# Patient Record
Sex: MALE | Race: WHITE | Employment: FULL TIME | URBAN - METROPOLITAN AREA
[De-identification: names, ages, dates, MRNs, and addresses within clinical notes are randomized per-mention and may not be internally consistent; named-entity substitution may affect disease eponyms.]

---

## 2021-10-14 ENCOUNTER — HOSPITAL ENCOUNTER (OUTPATIENT)
Dept: NEUROLOGY | Age: 56
Discharge: HOME | End: 2021-10-14
Payer: COMMERCIAL

## 2021-10-14 ENCOUNTER — HOSPITAL ENCOUNTER (OUTPATIENT)
Dept: NON INVASIVE DIAGNOSTICS | Age: 56
Discharge: HOME | End: 2021-10-14
Payer: COMMERCIAL

## 2021-10-14 DIAGNOSIS — R01.2 OTHER CARDIAC SOUNDS: ICD-10-CM

## 2021-10-14 DIAGNOSIS — R03.0 ELEVATED BLOOD PRESSURE READING WITHOUT DIAGNOSIS OF HYPERTENSION: ICD-10-CM

## 2021-10-14 DIAGNOSIS — R42 DIZZINESS AND GIDDINESS: ICD-10-CM

## 2021-10-14 DIAGNOSIS — Z82.49 FAMILY HISTORY OF ISCHEMIC HEART DISEASE AND OTHER DISEASES OF THE CIRCULATORY SYSTEM: ICD-10-CM

## 2021-10-14 DIAGNOSIS — E78.5 HYPERLIPIDEMIA, UNSPECIFIED HYPERLIPIDEMIA TYPE: ICD-10-CM

## 2021-10-14 LAB
LV EF: 50 %
LVEF MODALITY: NORMAL

## 2021-10-14 PROCEDURE — 93320 DOPPLER ECHO COMPLETE: CPT

## 2021-10-14 PROCEDURE — 93351 STRESS TTE COMPLETE: CPT

## 2021-10-14 PROCEDURE — 93225 XTRNL ECG REC<48 HRS REC: CPT

## 2021-10-14 PROCEDURE — 93226 XTRNL ECG REC<48 HR SCAN A/R: CPT

## 2021-10-14 PROCEDURE — 6360000004 HC RX CONTRAST MEDICATION: Performed by: INTERNAL MEDICINE

## 2021-10-14 RX ADMIN — PERFLUTREN 1.65 MG: 6.52 INJECTION, SUSPENSION INTRAVENOUS at 08:34

## 2021-10-14 NOTE — PROGRESS NOTES
IV started for OP Definity per echo protocol. Definity given per IV per echo protocol. Pt tolerated well. IV DC'd as ordered per protocol. Pt tolerated well.

## 2021-10-15 NOTE — PROGRESS NOTES
[address]    10/19/2021    Isaac Garza (:  1965) is a 63 y.o. male is here for evaluation of near syncope and abnormal stress test.    Referring Provider: Yasmine Baugh    HISTORY:  Mr. Priyanka Valdez is here for evaluation of an abnormal stress test.  He has a history of elevated blood pressure without diagnosis of HTN, prediabetes, hyperlipidemia, obesity, and TATYANA. He does not smoke. Father was diagnosed with heart disease in early 63's but also drank alcohol, smoked cigarettes. He was in [facility] ER on 21 with lightheaded and short of breath while urinating. He often has to push hard when urinating so he usually sits down. Since he was using a gas station bathroom, he remained standing to void. He felt light headed while standing and this lasted for 30 minutes. He appeared pale and diaphoretic to his wife. EKG was unremarkable and troponin was negative x 2. He was discharged to home. He was seen in his PCP's office and stress echo was ordered. He underwent stress testing, which was abnormal, and subsequently referred for further evaluation. REVIEW OF SYSTEMS:  A complete review of systems was reviewed and is negative except as noted in the history of present illness. Prior to Visit Medications    Medication Sig Taking? Authorizing Provider   omeprazole (PRILOSEC) 20 MG delayed release capsule  Yes Historical Provider, MD   cetirizine (ZYRTEC) 10 MG tablet Take 10 mg by mouth daily Yes Historical Provider, MD   tadalafil (CIALIS) 20 MG tablet Take 20 mg by mouth as needed for Erectile Dysfunction Yes Historical Provider, MD        Allergies   Allergen Reactions    Sulfa Antibiotics Rash       History reviewed. No pertinent past medical history. History reviewed. No pertinent surgical history.     Social History     Tobacco Use    Smoking status: Never Smoker    Smokeless tobacco: Never Used   Substance Use Topics    Alcohol use: Not on file Family History   Problem Relation Age of Onset    Heart Disease Father     Hypertension Father        PHYSICAL EXAMINATION:  Vitals:    10/19/21 1015   BP: 128/88   Site: Left Upper Arm   Position: Sitting   Cuff Size: Large Adult   Pulse: 73   SpO2: 98%   Weight: 233 lb 9.6 oz (106 kg)   Height: 5' 11\" (1.803 m)     Estimated body mass index is 32.58 kg/m² as calculated from the following:    Height as of this encounter: 5' 11\" (1.803 m). Weight as of this encounter: 233 lb 9.6 oz (106 kg). Physical Exam  Constitutional:       Appearance: He is well-developed. He is not diaphoretic. HENT:      Head: Normocephalic and atraumatic. Eyes:      General: No scleral icterus. Extraocular Movements: Extraocular movements intact. Conjunctiva/sclera: Conjunctivae normal.   Neck:      Vascular: No JVD. Cardiovascular:      Rate and Rhythm: Normal rate and regular rhythm. Heart sounds: Normal heart sounds. No murmur heard. No friction rub. No gallop. Pulmonary:      Effort: Pulmonary effort is normal. No respiratory distress. Breath sounds: Normal breath sounds. No wheezing or rales. Abdominal:      General: Bowel sounds are normal.      Palpations: Abdomen is soft. Tenderness: There is no abdominal tenderness. Musculoskeletal:         General: Normal range of motion. Cervical back: Normal range of motion and neck supple. Skin:     General: Skin is warm and dry. Findings: No rash. Neurological:      General: No focal deficit present. Mental Status: He is alert and oriented to person, place, and time. Psychiatric:         Mood and Affect: Mood normal.         Behavior: Behavior normal.         Thought Content: Thought content normal.         Judgment: Judgment normal.           I have reviewed all pertinent lab results and diagnostic testing.       Care Everywhere labs reviewed    LABS:  CBC: No results found for: WBC, RBC, HGB, HCT, MCV, RDW, PLT  CMP: No results found for: NA, K, CL, CO2, BUN, CREATININE, GFRAA, AGRATIO, LABGLOM, GLUCOSE, PROT, CALCIUM, BILITOT, ALKPHOS, AST, ALT  LIPIDS: No components found for: TOTAL CHOLESTEROL,  HDL,  LDL,  TRIGLYCERIDES  PT/INR: No results found for: PTINR     Echocardiogram 10/14/21:   Summary   -Normal left ventricle size, wall thickness, and systolic function with an   estimated ejection fraction of 55-60%. -No regional wall motion abnormalities are seen.   -Normal diastolic function. Avg. E/e'=6.5    Stress echo 10/14/21:  Summary   Abnormal stress echocardiogram with abnormal septal wall motion. Hypertensive response to exercise with blood pressure of 206/94. Recommend cardiology evaluation     ECG   SInus tachycardia. Less than 0.5 mm st deviation with exercise. No ischemic EKG changes. Arrhythmias   Rare PVC         ASSESSMENT/PLAN:  1. Abnormal stress echocardiogram  - 10/14/21 stress echo -- abnormal septal wall motion. Less than 0.5 mm ST deviation with exercise, no ischemic changes. Plan : Images reviewed. Concern for ischemia but potentially could be blood pressure related. Would recommend imaging test such as coronary CTA or cardiac cath. I would favor cardiac catheterization for definitive diagnosis, or exclusion, of significant CAD. Performance of the procedure as well as the associated risk, benefits and alternatives have been discussed. Good pertinent questions have been asked and answered. 2. Light headedness / Near syncope  - 9/24/21 ER visit for light headedness when straining to urinate while standing  - ER - EKG and blood work normal    Plan: Sounds like micturition near syncope. Does have an abnormal stress test, so therefore cannot exclude coronary disease. 3. Hyperlipidemia, unspecified hyperlipidemia type  - hx of LDL above 100 in 2019 (HDL- 43, LDL- 106) and 2017 (HDL- 52, LDL- 107)  - 9/7/21 - TC- 167, TG- 86, HDL- 53, LDL- 98    Plan: Stable.       4. Prediabetes  - 9/7/21 - Hgb A1C - 5.9    Plan : Per PCP. 5. Elevated blood pressure reading without diagnosis of hypertension  - diagnosis in PCP's records. - /87 during [facility] visit 9/2021    Plan: Monitor blood pressure. Likely will need an antihypertensive medication. 6.  Obstructive sleep apnea    Plan: Needs sleep study and CPAP if appropriate. Plan:  1. Recommend cardiac catheterization. 2.  Enteric-coated aspirin 81 mg daily. Return in about 2 months (around 12/19/2021). Scribe's attestation: This note was scribed in the presence of Bi Freeman M.D. by Anabell Campo RN    Physician Attestation: The scribe's documentation has been prepared under my direction and personally reviewed by me in its entirety. I confirm that the note above accurately reflects all work, treatment, procedures, and medical decision making performed by me. An  electronic signature was used to authenticate this note. Lisa Hernandez MD, [facility] [place], [address]

## 2021-10-19 ENCOUNTER — OFFICE VISIT (OUTPATIENT)
Dept: CARDIOLOGY CLINIC | Age: 56
End: 2021-10-19
Payer: COMMERCIAL

## 2021-10-19 VITALS
DIASTOLIC BLOOD PRESSURE: 88 MMHG | OXYGEN SATURATION: 98 % | SYSTOLIC BLOOD PRESSURE: 128 MMHG | BODY MASS INDEX: 32.7 KG/M2 | HEART RATE: 73 BPM | HEIGHT: 71 IN | WEIGHT: 233.6 LBS

## 2021-10-19 DIAGNOSIS — R42 LIGHT HEADEDNESS: ICD-10-CM

## 2021-10-19 DIAGNOSIS — R55 NEAR SYNCOPE: ICD-10-CM

## 2021-10-19 DIAGNOSIS — R03.0 ELEVATED BLOOD PRESSURE READING WITHOUT DIAGNOSIS OF HYPERTENSION: ICD-10-CM

## 2021-10-19 DIAGNOSIS — R94.39 ABNORMAL STRESS ECHOCARDIOGRAM: Primary | ICD-10-CM

## 2021-10-19 DIAGNOSIS — R73.03 PREDIABETES: ICD-10-CM

## 2021-10-19 DIAGNOSIS — E78.5 HYPERLIPIDEMIA, UNSPECIFIED HYPERLIPIDEMIA TYPE: ICD-10-CM

## 2021-10-19 LAB
ACQUISITION DURATION: NORMAL S
AVERAGE HEART RATE: 83 BPM
EKG DIAGNOSIS: NORMAL
HOLTER MAX HEART RATE: 125 BPM
HOOKUP DATE: NORMAL
HOOKUP TIME: NORMAL
MAX HEART RATE TIME/DATE: NORMAL
MIN HEART RATE TIME/DATE: NORMAL
MIN HEART RATE: 63 BPM
NUMBER OF QRS COMPLEXES: NORMAL
NUMBER OF SUPRAVENTRICULAR COUPLETS: 0
NUMBER OF SUPRAVENTRICULAR ECTOPICS: 20
NUMBER OF SUPRAVENTRICULAR ISOLATED BEATS: 17
NUMBER OF VENTRICULAR BIGEMINAL CYCLES: 0
NUMBER OF VENTRICULAR COUPLETS: 0
NUMBER OF VENTRICULAR ECTOPICS: 4

## 2021-10-19 PROCEDURE — 93000 ELECTROCARDIOGRAM COMPLETE: CPT | Performed by: INTERNAL MEDICINE

## 2021-10-19 PROCEDURE — 99205 OFFICE O/P NEW HI 60 MIN: CPT | Performed by: INTERNAL MEDICINE

## 2021-10-19 RX ORDER — OMEPRAZOLE 20 MG/1
CAPSULE, DELAYED RELEASE ORAL
COMMUNITY
Start: 2021-09-28

## 2021-10-19 RX ORDER — TADALAFIL 20 MG/1
20 TABLET ORAL PRN
COMMUNITY

## 2021-10-19 RX ORDER — CETIRIZINE HYDROCHLORIDE 10 MG/1
10 TABLET ORAL DAILY
COMMUNITY

## 2021-10-19 NOTE — PATIENT INSTRUCTIONS
1.  Need to monitor blood pressure at home. Want blood pressure to be consistently under 140/90. We prefer systolic (top #) 204 or lower and diastolic (bottom #) 80 or lower. 2.  Work on weight loss and limiting salt and sodium intake to help with blood pressure  3. Agree with sleep study and resuming treatment for sleep apnea. This may help BP control    4. Recommend imaging of coronary arteries  Option #1 - coronary CTA (non-invasive test). This is only diagnostic test.  Uses CT scan images of coronary arteries and look for blockage. Heart rate needs to be under 60 to make sure images are optimal.    Option # 2 - coronary angiogram (invasive testing). Diagnostic test, but can proceed with fixing artery that has blockages ~ 80% or greater with balloon and place coronary stent. Angiogram instructions  1. No food after midnight or at least 8 hours before surgery  2. No fluids after midnight or at least 8 hours before surgery. Okay to take morning medications with enough water to swallow pills. No need to hold any medications        Patient Education        Coronary Angiogram with Possible Treatment: Before Your Procedure  What is a coronary angiogram?     A coronary angiogram is a test to look at the blood vessels of your heart. These are called the coronary arteries. You may have this test to see if any of these arteries are narrowed or blocked. The test may also be used to measure the pressure in your heart's chambers. A doctor will put a thin, flexible tube into a blood vessel in your upper leg or groin. This tube is called a catheter. In some cases, the doctor may insert it in a blood vessel near your elbow or wrist.  During the test, the doctor moves the catheter through the blood vessel and into your heart. Then the doctor puts a dye into the catheter. This makes your coronary arteries show up on a screen. Your doctor can see if the arteries are blocked or narrowed.   If you have a narrowed or blocked artery, the doctor may do an angioplasty or a coronary stent procedure. In an angioplasty, the doctor puts a catheter with a tiny balloon at the tip into the blocked area and inflates it. The balloon presses the fatty buildup (plaque) against the walls of the artery. This makes more room for blood to flow. In most cases, the doctor then puts a stent in the artery. A stent is a small, expandable tube. It presses against the walls of the artery. The stent is left in the artery to keep it open. This helps blood flow. The catheter is removed from your body. Follow-up care is a key part of your treatment and safety. Be sure to make and go to all appointments, and call your doctor if you are having problems. It's also a good idea to know your test results and keep a list of the medicines you take. How do you prepare for the procedure? Procedures can be stressful. This information will help you understand what you can expect. And it will help you safely prepare for your procedure. Preparing for the procedure    · Be sure you have someone to take you home. Anesthesia and pain medicine will make it unsafe for you to drive or get home on your own.     · Understand exactly what procedure is planned, along with the risks, benefits, and other options. · Tell your doctor ALL the medicines, vitamins, supplements, and herbal remedies you take. Some may increase the risk of problems during your procedure. Your doctor will tell you if you should stop taking any of them before the procedure and how soon to do it.     · If you take aspirin or some other blood thinner, ask your doctor if you should stop taking it before your procedure. Make sure that you understand exactly what your doctor wants you to do. These medicines increase the risk of bleeding.     · Make sure your doctor and the hospital have a copy of your advance directive. If you don't have one, you may want to prepare one.  It lets others know your health care wishes. It's a good thing to have before any type of surgery or procedure. What happens on the day of the procedure? · Follow the instructions exactly about when to stop eating and drinking. If you don't, your procedure may be canceled. If your doctor told you to take your medicines on the day of the procedure, take them with only a sip of water.     · Take a bath or shower before you come in for your procedure. Do not apply lotions, perfumes, deodorants, or nail polish.     · Do not shave the procedure site yourself.     · Take off all jewelry and piercings. And take out contact lenses, if you wear them. At the hospital or surgery center   · Bring a picture ID.     · You will be kept comfortable and safe by your anesthesia provider. You may get medicine that relaxes you or puts you in a light sleep. The area being worked on will be numb.     · After the procedure, pressure will be applied to the area where the catheter was put into your artery. Then you may have a bandage or a compression device on your groin or arm at the catheter insertion site. This will prevent bleeding.     · Nurses will check your heart rate and blood pressure. The nurse also will check the catheter site for bleeding.     · If the catheter was put in your groin, you will need to lie still and keep your leg straight for several hours. The nurse may put a weighted bag on your leg to help you keep it still.     · If the catheter was put in your arm, you may be able to sit up and get out of bed right away. But you will need to keep your arm still for at least one hour.     · You may be able to go home later the same day, or you may need to stay in the hospital overnight.     · You may have a bruise where the catheter was put in your groin or arm. This is normal and will go away. When should you call your doctor?    · You have questions or concerns.     · You don't understand how to prepare for your procedure.     · You become ill before the procedure (such as fever, flu, or a cold).     · You need to reschedule or have changed your mind about having the procedure. Where can you learn more? Go to [email or web address] and sign in to your [employer] account. Enter [phone] in the [facility] box to learn more about \"Coronary Angiogram with Possible Treatment: Before Your Procedure. \"     If you do not have an account, please click on the \"Sign Up Now\" link. Current as of: April 29, 2021               Content Version: 13.0  © 6056-7658 Healthwise, Incorporated. Care instructions adapted under license by [facility] ([facility]). If you have questions about a medical condition or this instruction, always ask your healthcare professional. [address] any warranty or liability for your use of this information.

## 2021-10-20 ENCOUNTER — TELEPHONE (OUTPATIENT)
Dept: CARDIOLOGY CLINIC | Age: 56
End: 2021-10-20

## 2021-10-20 DIAGNOSIS — R55 NEAR SYNCOPE: ICD-10-CM

## 2021-10-20 DIAGNOSIS — R94.39 ABNORMAL STRESS ECHOCARDIOGRAM: Primary | ICD-10-CM

## 2021-10-20 DIAGNOSIS — R06.02 SHORTNESS OF BREATH: ICD-10-CM

## 2021-10-20 NOTE — TELEPHONE ENCOUNTER
Patient called the office today saying that he wanted to proceed with C, possible PCI as discussed at office visit on 10/19/21. Reviewed instructions again with patient. Answered all patient's questions. He would prefer a Friday procedure if possible to limit his time off work. 1.  No food or drink after midnight or 8 hours prior to procedure  2. Okay to take morning meds with small sips of water  3. No concerning allergies for procedure. Please call patient to schedule. He is at work, but okay to leave message with options for procedure date and time.

## 2021-11-05 ENCOUNTER — HOSPITAL ENCOUNTER (OUTPATIENT)
Dept: CARDIAC CATH/INVASIVE PROCEDURES | Age: 56
Discharge: HOME | End: 2021-11-05
Attending: INTERNAL MEDICINE | Admitting: INTERNAL MEDICINE
Payer: COMMERCIAL

## 2021-11-05 VITALS
HEIGHT: 71 IN | DIASTOLIC BLOOD PRESSURE: 96 MMHG | HEART RATE: 65 BPM | TEMPERATURE: 98 F | OXYGEN SATURATION: 96 % | BODY MASS INDEX: 32.62 KG/M2 | SYSTOLIC BLOOD PRESSURE: 138 MMHG | WEIGHT: 233 LBS

## 2021-11-05 DIAGNOSIS — R94.39 ABNORMAL STRESS ECHOCARDIOGRAM: ICD-10-CM

## 2021-11-05 DIAGNOSIS — R55 NEAR SYNCOPE: ICD-10-CM

## 2021-11-05 DIAGNOSIS — R06.02 SHORTNESS OF BREATH: ICD-10-CM

## 2021-11-05 LAB
ANION GAP SERPL CALCULATED.3IONS-SCNC: 10 MMOL/L (ref 3–16)
BUN BLDV-MCNC: 14 MG/DL (ref 7–20)
CALCIUM SERPL-MCNC: 9.2 MG/DL (ref 8.3–10.6)
CHLORIDE BLD-SCNC: 105 MMOL/L (ref 99–110)
CO2: 24 MMOL/L (ref 21–32)
CREAT SERPL-MCNC: 0.7 MG/DL (ref 0.9–1.3)
EKG ATRIAL RATE: 65 BPM
EKG DIAGNOSIS: NORMAL
EKG P AXIS: 42 DEGREES
EKG P-R INTERVAL: 136 MS
EKG Q-T INTERVAL: 400 MS
EKG QRS DURATION: 82 MS
EKG QTC CALCULATION (BAZETT): 416 MS
EKG R AXIS: -3 DEGREES
EKG T AXIS: 37 DEGREES
EKG VENTRICULAR RATE: 65 BPM
GFR AFRICAN AMERICAN: >60
GFR NON-AFRICAN AMERICAN: >60
GLUCOSE BLD-MCNC: 102 MG/DL (ref 70–99)
HCT VFR BLD CALC: 41.4 % (ref 40.5–52.5)
HEMOGLOBIN: 14 G/DL (ref 13.5–17.5)
LEFT VENTRICULAR EJECTION FRACTION MODE: NORMAL
LV EF: 65 %
MCH RBC QN AUTO: 29.5 PG (ref 26–34)
MCHC RBC AUTO-ENTMCNC: 33.7 G/DL (ref 31–36)
MCV RBC AUTO: 87.6 FL (ref 80–100)
PDW BLD-RTO: 13 % (ref 12.4–15.4)
PLATELET # BLD: 239 K/UL (ref 135–450)
PMV BLD AUTO: 8.8 FL (ref 5–10.5)
POTASSIUM SERPL-SCNC: 4.2 MMOL/L (ref 3.5–5.1)
RBC # BLD: 4.72 M/UL (ref 4.2–5.9)
SODIUM BLD-SCNC: 139 MMOL/L (ref 136–145)
WBC # BLD: 6.6 K/UL (ref 4–11)

## 2021-11-05 PROCEDURE — C1894 INTRO/SHEATH, NON-LASER: HCPCS

## 2021-11-05 PROCEDURE — C1769 GUIDE WIRE: HCPCS

## 2021-11-05 PROCEDURE — 80048 BASIC METABOLIC PNL TOTAL CA: CPT

## 2021-11-05 PROCEDURE — 99152 MOD SED SAME PHYS/QHP 5/>YRS: CPT | Performed by: INTERNAL MEDICINE

## 2021-11-05 PROCEDURE — 2500000003 HC RX 250 WO HCPCS

## 2021-11-05 PROCEDURE — 93458 L HRT ARTERY/VENTRICLE ANGIO: CPT

## 2021-11-05 PROCEDURE — 93458 L HRT ARTERY/VENTRICLE ANGIO: CPT | Performed by: INTERNAL MEDICINE

## 2021-11-05 PROCEDURE — 6360000004 HC RX CONTRAST MEDICATION: Performed by: INTERNAL MEDICINE

## 2021-11-05 PROCEDURE — 93010 ELECTROCARDIOGRAM REPORT: CPT | Performed by: INTERNAL MEDICINE

## 2021-11-05 PROCEDURE — 2709999900 HC NON-CHARGEABLE SUPPLY

## 2021-11-05 PROCEDURE — 85027 COMPLETE CBC AUTOMATED: CPT

## 2021-11-05 PROCEDURE — 36415 COLL VENOUS BLD VENIPUNCTURE: CPT

## 2021-11-05 PROCEDURE — 93005 ELECTROCARDIOGRAM TRACING: CPT | Performed by: INTERNAL MEDICINE

## 2021-11-05 PROCEDURE — 99153 MOD SED SAME PHYS/QHP EA: CPT

## 2021-11-05 PROCEDURE — 6360000002 HC RX W HCPCS

## 2021-11-05 PROCEDURE — 99152 MOD SED SAME PHYS/QHP 5/>YRS: CPT

## 2021-11-05 RX ORDER — SODIUM CHLORIDE 0.9 % (FLUSH) 0.9 %
5-40 SYRINGE (ML) INJECTION PRN
Status: CANCELLED | OUTPATIENT
Start: 2021-11-05

## 2021-11-05 RX ORDER — SODIUM CHLORIDE 0.9 % (FLUSH) 0.9 %
5-40 SYRINGE (ML) INJECTION PRN
Status: DISCONTINUED | OUTPATIENT
Start: 2021-11-05 | End: 2021-11-05 | Stop reason: HOSPADM

## 2021-11-05 RX ORDER — ONDANSETRON 2 MG/ML
4 INJECTION INTRAMUSCULAR; INTRAVENOUS EVERY 6 HOURS PRN
Status: CANCELLED | OUTPATIENT
Start: 2021-11-05

## 2021-11-05 RX ORDER — SODIUM CHLORIDE 0.9 % (FLUSH) 0.9 %
5-40 SYRINGE (ML) INJECTION EVERY 12 HOURS SCHEDULED
Status: CANCELLED | OUTPATIENT
Start: 2021-11-05

## 2021-11-05 RX ORDER — SODIUM CHLORIDE 9 MG/ML
INJECTION, SOLUTION INTRAVENOUS CONTINUOUS
Status: DISCONTINUED | OUTPATIENT
Start: 2021-11-05 | End: 2021-11-05 | Stop reason: HOSPADM

## 2021-11-05 RX ORDER — ACETAMINOPHEN 325 MG/1
650 TABLET ORAL EVERY 4 HOURS PRN
Status: CANCELLED | OUTPATIENT
Start: 2021-11-05

## 2021-11-05 RX ORDER — SODIUM CHLORIDE 0.9 % (FLUSH) 0.9 %
5-40 SYRINGE (ML) INJECTION EVERY 12 HOURS SCHEDULED
Status: DISCONTINUED | OUTPATIENT
Start: 2021-11-05 | End: 2021-11-05 | Stop reason: HOSPADM

## 2021-11-05 RX ORDER — LEVOFLOXACIN 500 MG/1
500 TABLET, FILM COATED ORAL DAILY
COMMUNITY

## 2021-11-05 RX ORDER — MELOXICAM 15 MG/1
15 TABLET ORAL DAILY
COMMUNITY

## 2021-11-05 RX ORDER — SODIUM CHLORIDE 9 MG/ML
25 INJECTION, SOLUTION INTRAVENOUS PRN
Status: DISCONTINUED | OUTPATIENT
Start: 2021-11-05 | End: 2021-11-05 | Stop reason: HOSPADM

## 2021-11-05 RX ORDER — SODIUM CHLORIDE 9 MG/ML
25 INJECTION, SOLUTION INTRAVENOUS PRN
Status: CANCELLED | OUTPATIENT
Start: 2021-11-05

## 2021-11-05 NOTE — PRE SEDATION
mL IntraVENous HASEEB Travis MD           Past Medical History:  No past medical history on file. Surgical History:  No past surgical history on file. Pre-Sedation:  Pre-Sedation Documentation and Exam:  I have personally completed a history, physical exam & review of systems for this patient (see notes). Prior History of Anesthesia Complications:   none    Modified Mallampati:  III (soft palate, base of uvula visible)    ASA Classification:  Class 1 - A normal healthy patient    Kari Scale: Activity:  2 - Able to move 4 extremities voluntarily on command  Respiration:  2 - Able to breathe deeply and cough freely  Circulation:  2 - BP+/- 20mmHg of normal  Consciousness:  2 - Fully awake  Oxygen Saturation (color):  2 - Able to maintain oxygen saturation >92% on room air    Sedation/Anesthesia Plan:  Guard the patient's safety and welfare. Minimize physical discomfort and pain. Minimize negative psychological responses to treatment by providing sedation and analgesia and maximize the potential amnesia. Patient to meet pre-procedure discharge plan.     Medication Planned:  midazolam intravenously and fentanyl intravenously    Patient is an appropriate candidate for plan of sedation:   yes      Electronically signed by Merlyn Travis MD on 11/5/2021 at 9:17 AM

## 2021-11-05 NOTE — BRIEF OP NOTE
Cardiac Cath 11/5/2021:  Access: Right RA  Ultrasound: Ultrasound guidance used to determine after mentioned artery patency, size (> 2 mm), anatomic variations and ideal puncture location. Real-time ultrasound utilized concurrent with vascular needle entry into the artery. Images permanently recorded and reported in the patient chart. Hemostasis: TR band    Moderate Sedation:  Start time: 0913  Stop time: 0938  3 mg versed   100 mcg fentanyl   An independent trained observer pushed medications at my direction. We monitored the patient's level of consciousness and vital signs/physiologic status throughout the procedure duration (see start and start times above). Bleeding risk: Low  LVEDP: 7 mmHg  AO: 101/55 mmHg  Estimated blood loss: Less than 20 mL  Contrast: 67 mL  Fluoroscopy time: 2.4 min. Anatomy:   LM-normal  LAD-normal  Cx-normal  OM1- normal  RCA-normal, dominant  RPDA- normal  LVEF-65%    Impression:  1. Normal coronary arteries. 2.  Normal LV systolic function. 3.  False positive stress test.    Plan:  1. Medical management.

## 2021-11-19 ENCOUNTER — OFFICE VISIT (OUTPATIENT)
Dept: CARDIOLOGY CLINIC | Age: 56
End: 2021-11-19
Payer: COMMERCIAL

## 2021-11-19 VITALS
OXYGEN SATURATION: 97 % | HEART RATE: 67 BPM | WEIGHT: 230.2 LBS | SYSTOLIC BLOOD PRESSURE: 122 MMHG | DIASTOLIC BLOOD PRESSURE: 84 MMHG | RESPIRATION RATE: 17 BRPM | BODY MASS INDEX: 32.23 KG/M2 | HEIGHT: 71 IN

## 2021-11-19 DIAGNOSIS — R06.83 SNORES: ICD-10-CM

## 2021-11-19 DIAGNOSIS — R94.39 ABNORMAL STRESS TEST: Primary | ICD-10-CM

## 2021-11-19 DIAGNOSIS — E78.2 MIXED HYPERLIPIDEMIA: ICD-10-CM

## 2021-11-19 PROCEDURE — 99214 OFFICE O/P EST MOD 30 MIN: CPT | Performed by: NURSE PRACTITIONER

## 2021-11-19 NOTE — PROGRESS NOTES
[address]     Outpatient Follow Up Note    CHIEF COMPLAINT / HPI: Hospital Follow Up secondary to abnormal stress echo     Hospital record has been reviewed  Hospital Course progressed as follows per discharge summary:     11/5/21   Hospital course:  Patient had scheduled LHC due to abnormal stress echo. LHC showed normal coronary arteries. Patient had false positive stress echo. Mark Martins is 61 y.o. male who presents today for a routine follow up after a recent hospitalization related to the above mentioned issues. Subjective:   Since the time of discharge, the patient states he feels great. He has changed his eating habits and limits intake of red meat. He is walking more and has lost 10 lbs since the summer. Denies any chest pain, shortness of breath, palpitations, dizziness, or swelling. Has not had any episode of feeling like he could pass out since September. Denies any issue with radial site. Was diagnosed with TATYANA in the past but has not worn CPAP in >10 years. With regard to medication therapy the patient has been compliant with prescribed regimen. They have tolerated therapy to date. No past medical history on file. Social History:    Social History     Tobacco Use   Smoking Status Never Smoker   Smokeless Tobacco Never Used     Current Medications:  Current Outpatient Medications   Medication Sig Dispense Refill    levoFLOXacin (LEVAQUIN) 500 MG tablet Take 500 mg by mouth daily For 21 days      meloxicam (MOBIC) 15 MG tablet Take 15 mg by mouth daily      omeprazole (PRILOSEC) 20 MG delayed release capsule       cetirizine (ZYRTEC) 10 MG tablet Take 10 mg by mouth daily      tadalafil (CIALIS) 20 MG tablet Take 20 mg by mouth as needed for Erectile Dysfunction       No current facility-administered medications for this visit. REVIEW OF SYSTEMS:   CONSTITUTIONAL: No major weight gain or loss, fatigue, weakness, night sweats or fever.  There's been no change in patient's cardiac risk factors and adjusted pharmacologic treatment as needed. In addition, I have reinforced the need for patient directed risk factor modification. Further evaluation will be based upon the patient's clinical course and testing results. All questions and concerns were addressed to the patient    The patient  currently  is not smoking. The risks related to smoking were reviewed with the patient. Recommend maintaining a smoke-free lifestyle. Dual Antiplatelet therapy / anti-coagulation has not been recommended / prescribed for this patient. Pt is not on a BB; no MI  Pt is not on an ace-i/ARB; no sHF  Pt is not on a statin; LDL controlled w/o statin     Saturated fat diet discussed  Exercise program discussed    Thank you for allowing to us to participate in the care of Cristo Thompson.       [facility]  Documentation of today's visit sent to PCP

## 2022-12-02 ENCOUNTER — OFFICE VISIT (OUTPATIENT)
Dept: CARDIOLOGY CLINIC | Age: 57
End: 2022-12-02
Payer: COMMERCIAL

## 2022-12-02 VITALS
WEIGHT: 229.6 LBS | HEART RATE: 97 BPM | HEIGHT: 71 IN | OXYGEN SATURATION: 95 % | DIASTOLIC BLOOD PRESSURE: 82 MMHG | BODY MASS INDEX: 32.14 KG/M2 | SYSTOLIC BLOOD PRESSURE: 142 MMHG

## 2022-12-02 DIAGNOSIS — E78.2 MIXED HYPERLIPIDEMIA: ICD-10-CM

## 2022-12-02 DIAGNOSIS — I10 PRIMARY HYPERTENSION: Primary | ICD-10-CM

## 2022-12-02 PROCEDURE — 99213 OFFICE O/P EST LOW 20 MIN: CPT | Performed by: NURSE PRACTITIONER

## 2022-12-02 PROCEDURE — 3078F DIAST BP <80 MM HG: CPT | Performed by: NURSE PRACTITIONER

## 2022-12-02 PROCEDURE — 3074F SYST BP LT 130 MM HG: CPT | Performed by: NURSE PRACTITIONER

## 2022-12-02 RX ORDER — AZITHROMYCIN 250 MG/1
250 TABLET, FILM COATED ORAL DAILY
COMMUNITY

## 2022-12-02 RX ORDER — BENZONATATE 100 MG/1
200 CAPSULE ORAL 3 TIMES DAILY PRN
COMMUNITY

## 2022-12-02 RX ORDER — ROSUVASTATIN CALCIUM 20 MG/1
20 TABLET, COATED ORAL DAILY
COMMUNITY

## 2022-12-02 RX ORDER — HYDROCHLOROTHIAZIDE 12.5 MG/1
12.5 CAPSULE, GELATIN COATED ORAL DAILY
COMMUNITY

## 2022-12-02 NOTE — PROGRESS NOTES
[facility]     Outpatient Follow Up Note    Yariel Grullon is 61 y.o. male who presents today with a history of paternal FH heart disease and HTN. CHIEF COMPLAINT / HPI:  Follow Up secondary to hypertension. His home BP runs ~ 115-130/    Subjective:   He denies significant chest pain. There is SOB/RODRIGUEZ attributed to a current URI. He's on benzonatate and z-pack. The patient denies orthopnea/PND. He started wearing a CPAP again about a month ago. The patient does not have swelling. The patients weight is stable . The patient is not experiencing palpitations or dizziness. These symptoms show no change since the last OV. With regard to medication therapy the patient has been compliant with prescribed regimen. They have tolerated therapy to date. No past medical history on file. Social History:    Social History     Tobacco Use   Smoking Status Never   Smokeless Tobacco Never     Current Medications:  Current Outpatient Medications   Medication Sig Dispense Refill    azithromycin (ZITHROMAX) 250 MG tablet Take 250 mg by mouth daily      benzonatate (TESSALON) 100 MG capsule Take 200 mg by mouth 3 times daily as needed for Cough      rosuvastatin (CRESTOR) 20 MG tablet Take 20 mg by mouth daily      hydroCHLOROthiazide (MICROZIDE) 12.5 MG capsule Take 12.5 mg by mouth daily      omeprazole (PRILOSEC) 20 MG delayed release capsule       cetirizine (ZYRTEC) 10 MG tablet Take 10 mg by mouth daily      levoFLOXacin (LEVAQUIN) 500 MG tablet Take 500 mg by mouth daily For 21 days (Patient not taking: No sig reported)      meloxicam (MOBIC) 15 MG tablet Take 15 mg by mouth daily (Patient not taking: Reported on 12/2/2022)      tadalafil (CIALIS) 20 MG tablet Take 20 mg by mouth as needed for Erectile Dysfunction       No current facility-administered medications for this visit. REVIEW OF SYSTEMS:    CONSTITUTIONAL: No major weight gain or loss, fatigue, weakness, night sweats or fever.   HEENT: No new vision difficulties or ringing in the ears. RESPIRATORY: No new SOB, PND, orthopnea or cough. CARDIOVASCULAR: See HPI  GI: No nausea, vomiting, diarrhea, constipation, abdominal pain or changes in bowel habits. : No urinary frequency, urgency, incontinence hematuria or dysuria. SKIN: No cyanosis or skin lesions. MUSCULOSKELETAL: No new muscle or joint pain. NEUROLOGICAL: No syncope or TIA-like symptoms. PSYCHIATRIC: No anxiety, pain, insomnia or depression    Objective:   PHYSICAL EXAM:        Vitals:    12/02/22 1149 12/02/22 1208   BP: 130/70 (!) 142/82   Site: Right Upper Arm Right Upper Arm   Position: Sitting    Cuff Size: Medium Adult Large Adult   Pulse: 97    SpO2: 95%    Weight: 229 lb 9.6 oz (104.1 kg)    Height: 5' 11\" (1.803 m)        VITALS:  /70 (Site: Right Upper Arm, Position: Sitting, Cuff Size: Medium Adult)   Pulse 97   Ht 5' 11\" (1.803 m)   Wt 229 lb 9.6 oz (104.1 kg)   SpO2 95%   BMI 32.02 kg/m²   CONSTITUTIONAL: Cooperative, no apparent distress, and appears well nourished / developed  NEUROLOGIC:  Awake and orientated to person, place and time. PSYCH: Calm affect. SKIN: Warm and dry. HEENT: Sclera non-icteric, normocephalic, neck supple, no elevation of JVP, normal carotid pulses with no bruits and thyroid normal size. LUNGS:  No increased work of breathing and clear to auscultation, no crackles or wheezing  CARDIOVASCULAR:  Regular rate and rhythm with no murmurs, gallops, rubs, or abnormal heart sounds, normal PMI. The apical impulses not displaced  JVP less than 8 cm H2O  Heart tones are crisp and normal  Cervical veins are not engorged  The carotid upstroke is normal in amplitude and contour without delay or bruit  JVP is not elevated  ABDOMEN:  Normal bowel sounds, non-distended and non-tender to palpation  EXT: No edema, no calf tenderness. Pulses are present bilaterally.     DATA:    No results found for: ALT, AST, GGT, ALKPHOS, BILITOT  Lab Results Component Value Date    CREATININE 0.7 (L) 11/05/2021    BUN 14 11/05/2021     11/05/2021    K 4.2 11/05/2021     11/05/2021    CO2 24 11/05/2021     No results found for: TSH, B2OTFNU, S6SVNXM, THYROIDAB  Lab Results   Component Value Date    WBC 6.6 11/05/2021    HGB 14.0 11/05/2021    HCT 41.4 11/05/2021    MCV 87.6 11/05/2021     11/05/2021     LABS :      10/17/22:  CHOLESTEROL <200 mg/dL 175     TRIGLYCERIDE <150 mg/dL 86     HDL CHOLESTEROL >=40 mg/dL 51     LDL CHOLESTEROL (CALCULATED) <100 mg/dL 108 High      TOTAL NON HDL CHOL <=129 mg/dL 124       SODIUM 136 - 145 mEq/L 139     POTASSIUM 3.5 - 5.0 mEq/L 4.2     CHLORIDE 98 - 107 mEq/L 105     CO2 22 - 29 mmol/L 24     GLUCOSE, RANDOM 74 - 100 mg/dL 95     BLD UREA NITROGEN 6 - 20 mg/dL 15     CREATININE 0.67 - 1.30 mg/dL 0.85     CALCIUM 8.6 - 10.4 mg/dL 9.4     TOTAL PROTEIN 6.4 - 8.3 gm/dL 7.4     ALBUMIN 3.5 - 5.2 gm/dL 4.7     TOTAL BILIRUBIN 0.1 - 1.4 mg/dL 0.4     ALK PHOSPHATASE 40 - 129 U/L 59     AST <=40 U/L 24     ALT <=41 U/L 30     ESTIMATED GFR >=60 mL/min/1.73 m2 101       Radiology Review:  Pertinent images / reports were reviewed as a part of this visit and reveals the following:    Stress Echo Test 10/14/21:   Summary   Abnormal stress echocardiogram with abnormal septal wall motion. Hypertensive response to exercise with blood pressure of 206/94. Recommend cardiology evaluation     Summary   -Normal left ventricle size, wall thickness, and systolic function with an   estimated ejection fraction of 55-60%. -No regional wall motion abnormalities are seen.   -Normal diastolic function. Avg. E/e'=6.5      Angiogram 11/5/21:  Anatomy:   LM-normal  LAD-normal  Cx-normal  OM1- normal  RCA-normal, dominant  RPDA- normal  LVEF-65%     Assessment:      Diagnosis Orders   1. Primary hypertension   ~controlled by home reporting and on arrival today ; suboptimal with recheck.  Will continue to monitor  ~started back on CPAP  ~HCTZ low dose 12.5 mg daily       2. Mixed hyperlipidemia   ~controlled on crestor  ~A1c 5.8%         I had the opportunity to review the clinical symptoms and presentation of Cordelia Garza. Plan:     Continue present management   F/U in one year    Overall the patient is stable from CV standpoint    I have addresed the patient's cardiac risk factors and adjusted pharmacologic treatment as needed. In addition, I have reinforced the need for patient directed risk factor modification. Further evaluation will be based upon the patient's clinical course and testing results. All questions and concerns were addressed to the patient. Alternatives to my treatment were discussed. The patient is not currently smoking. The risks related to smoking were reviewed with the patient. Recommend maintaining a smoke-free lifestyle. Patient is not on a beta-blocker : neg MI  Patient is not on an ace-i/ARB : neg CHF  Patient is on a statin    Dual Antiplatelet therapy / anti-coagulation has not been recommended / prescribed for this patient. The patient verbalizes understanding not to stop medications without discussing with us. Discussed exercise: 30-60 minutes 7 days/week : work related :  at  [employer] saturated fat [employer]. Thank you for allowing to us to participate in the care of YUSUF Smith    Documentation of today's visit sent to PCP

## 2023-12-01 ENCOUNTER — OFFICE VISIT (OUTPATIENT)
Dept: CARDIOLOGY CLINIC | Age: 58
End: 2023-12-01
Payer: COMMERCIAL

## 2023-12-01 VITALS
BODY MASS INDEX: 34.02 KG/M2 | OXYGEN SATURATION: 95 % | HEIGHT: 71 IN | WEIGHT: 243 LBS | DIASTOLIC BLOOD PRESSURE: 82 MMHG | SYSTOLIC BLOOD PRESSURE: 142 MMHG | HEART RATE: 75 BPM

## 2023-12-01 DIAGNOSIS — I10 PRIMARY HYPERTENSION: Primary | ICD-10-CM

## 2023-12-01 DIAGNOSIS — E78.2 MIXED HYPERLIPIDEMIA: ICD-10-CM

## 2023-12-01 PROCEDURE — 3077F SYST BP >= 140 MM HG: CPT | Performed by: NURSE PRACTITIONER

## 2023-12-01 PROCEDURE — 3079F DIAST BP 80-89 MM HG: CPT | Performed by: NURSE PRACTITIONER

## 2023-12-01 PROCEDURE — 93000 ELECTROCARDIOGRAM COMPLETE: CPT | Performed by: NURSE PRACTITIONER

## 2023-12-01 PROCEDURE — 99213 OFFICE O/P EST LOW 20 MIN: CPT | Performed by: NURSE PRACTITIONER

## 2023-12-01 RX ORDER — FAMOTIDINE 10 MG
10 TABLET ORAL 2 TIMES DAILY PRN
COMMUNITY

## 2023-12-01 NOTE — PROGRESS NOTES
[facility]     Outpatient Follow Up Note    Kwasi Mijares is 60 y.o. male who presents today with a history of paternal FH heart disease and HTN. CHIEF COMPLAINT / HPI:  Follow Up secondary to hypertension. His home BP runs ~ 133/80    Subjective:   He denies significant chest pain. There is no SOB/RODRIGUEZ. The patient denies orthopnea/PND. He's wearing a CPAP. The patient had some swelling in his ankles. The patients weight is stable . The patient is not experiencing palpitations or dizziness. He developed hives / welts on his hands while OOT and attributed to crestor. These symptoms are stable since the last OV. With regard to medication therapy the patient has been compliant with prescribed regimen. They have tolerated therapy to date. History reviewed. No pertinent past medical history. Social History:    Social History     Tobacco Use   Smoking Status Never   Smokeless Tobacco Never     Current Medications:  Current Outpatient Medications   Medication Sig Dispense Refill    Fexofenadine HCl (ALLEGRA ALLERGY PO) Take by mouth      hydroCHLOROthiazide (MICROZIDE) 12.5 MG capsule Take 1 capsule by mouth daily      tadalafil (CIALIS) 20 MG tablet Take 1 tablet by mouth as needed for Erectile Dysfunction      EPINEPHRINE HCL IJ Inject as directed as needed (Patient not taking: Reported on 12/1/2023)      famotidine (PEPCID) 10 MG tablet Take 1 tablet by mouth 2 times daily as needed (Patient not taking: Reported on 12/1/2023)      benzonatate (TESSALON) 100 MG capsule Take 2 capsules by mouth 3 times daily as needed for Cough (Patient not taking: Reported on 12/1/2023)       No current facility-administered medications for this visit. REVIEW OF SYSTEMS:    CONSTITUTIONAL: No major weight gain or loss, fatigue, weakness, night sweats or fever. HEENT: No new vision difficulties or ringing in the ears. RESPIRATORY: No new SOB, PND, orthopnea or cough.    CARDIOVASCULAR: See HPI  GI: No